# Patient Record
Sex: MALE | Race: OTHER | HISPANIC OR LATINO | Employment: OTHER | URBAN - METROPOLITAN AREA
[De-identification: names, ages, dates, MRNs, and addresses within clinical notes are randomized per-mention and may not be internally consistent; named-entity substitution may affect disease eponyms.]

---

## 2023-03-14 DIAGNOSIS — K21.9 GASTROESOPHAGEAL REFLUX DISEASE, UNSPECIFIED WHETHER ESOPHAGITIS PRESENT: Primary | ICD-10-CM

## 2023-03-14 RX ORDER — LOSARTAN POTASSIUM 25 MG/1
TABLET ORAL EVERY OTHER DAY
COMMUNITY
Start: 2017-06-30 | End: 2023-06-09 | Stop reason: SDUPTHER

## 2023-03-14 RX ORDER — METOPROLOL SUCCINATE 25 MG/1
1 TABLET, EXTENDED RELEASE ORAL EVERY OTHER DAY
COMMUNITY

## 2023-03-14 RX ORDER — ALBUTEROL SULFATE 90 UG/1
AEROSOL, METERED RESPIRATORY (INHALATION) EVERY 6 HOURS PRN
COMMUNITY
Start: 2019-12-05

## 2023-03-14 RX ORDER — PANTOPRAZOLE SODIUM 40 MG/1
TABLET, DELAYED RELEASE ORAL
COMMUNITY
End: 2023-03-14 | Stop reason: SDUPTHER

## 2023-03-14 RX ORDER — PANTOPRAZOLE SODIUM 40 MG/1
40 TABLET, DELAYED RELEASE ORAL 2 TIMES DAILY
Qty: 60 TABLET | Refills: 0 | Status: SHIPPED | OUTPATIENT
Start: 2023-03-14 | End: 2023-04-20 | Stop reason: SDUPTHER

## 2023-03-14 RX ORDER — PREDNISONE 2.5 MG/1
1 TABLET ORAL DAILY
COMMUNITY

## 2023-03-14 RX ORDER — ROSUVASTATIN CALCIUM 10 MG/1
1 TABLET, COATED ORAL NIGHTLY
COMMUNITY
Start: 2020-08-17

## 2023-03-14 RX ORDER — UMECLIDINIUM BROMIDE AND VILANTEROL TRIFENATATE 62.5; 25 UG/1; UG/1
POWDER RESPIRATORY (INHALATION)
COMMUNITY
Start: 2020-11-27

## 2023-03-14 RX ORDER — PREDNISONE 5 MG/1
TABLET ORAL
COMMUNITY
Start: 2018-05-23

## 2023-03-14 RX ORDER — FOLIC ACID 1 MG/1
1 TABLET ORAL DAILY
COMMUNITY
Start: 2016-01-22

## 2023-03-14 RX ORDER — TRAZODONE HYDROCHLORIDE 50 MG/1
TABLET ORAL
COMMUNITY
Start: 2016-05-20 | End: 2023-04-20 | Stop reason: SDUPTHER

## 2023-03-14 RX ORDER — DICYCLOMINE HYDROCHLORIDE 20 MG/1
TABLET ORAL 2 TIMES DAILY
COMMUNITY

## 2023-03-14 RX ORDER — SUCRALFATE 1 G/10ML
SUSPENSION ORAL 4 TIMES DAILY
COMMUNITY
Start: 2022-07-19

## 2023-03-14 NOTE — TELEPHONE ENCOUNTER
Refill Pantoprazole 40mg 1 twice a day prn     Pharmacy: [facility] [phone]   Pharm entered in pt chart    LR: 09/08/22  LV: 01/27/23  NV: 04/28/23

## 2023-04-10 ENCOUNTER — APPOINTMENT (OUTPATIENT)
Dept: PRIMARY CARE | Facility: CLINIC | Age: 78
End: 2023-04-10
Payer: MEDICARE

## 2023-04-11 ENCOUNTER — OFFICE VISIT (OUTPATIENT)
Dept: PRIMARY CARE | Facility: CLINIC | Age: 78
End: 2023-04-11
Payer: MEDICARE

## 2023-04-11 VITALS
WEIGHT: 195 LBS | BODY MASS INDEX: 29.22 KG/M2 | DIASTOLIC BLOOD PRESSURE: 70 MMHG | SYSTOLIC BLOOD PRESSURE: 114 MMHG | TEMPERATURE: 96 F

## 2023-04-11 DIAGNOSIS — L02.31 CELLULITIS AND ABSCESS OF BUTTOCK: ICD-10-CM

## 2023-04-11 DIAGNOSIS — K40.90 INGUINAL HERNIA OF LEFT SIDE WITHOUT OBSTRUCTION OR GANGRENE: Primary | ICD-10-CM

## 2023-04-11 DIAGNOSIS — R19.09 MASS OF LEFT INGUINAL REGION: ICD-10-CM

## 2023-04-11 DIAGNOSIS — L03.317 CELLULITIS AND ABSCESS OF BUTTOCK: ICD-10-CM

## 2023-04-11 PROCEDURE — 99214 OFFICE O/P EST MOD 30 MIN: CPT | Performed by: FAMILY MEDICINE

## 2023-04-11 PROCEDURE — 1160F RVW MEDS BY RX/DR IN RCRD: CPT | Performed by: FAMILY MEDICINE

## 2023-04-11 PROCEDURE — 1159F MED LIST DOCD IN RCRD: CPT | Performed by: FAMILY MEDICINE

## 2023-04-11 RX ORDER — SULFAMETHOXAZOLE AND TRIMETHOPRIM 800; 160 MG/1; MG/1
1 TABLET ORAL 2 TIMES DAILY
Qty: 20 TABLET | Refills: 0 | Status: SHIPPED | OUTPATIENT
Start: 2023-04-11 | End: 2023-04-18

## 2023-04-11 NOTE — PATIENT INSTRUCTIONS
I will prescribe antibiotics for the skin infection on your buttock region.  You have a swelling in the left inguinal region that may be a hernia, although it does not seem to protrude much with coughing.  I will order an ultrasound on this and refer you to a surgeon.  Go to the ER if you get fevers or severe abdominal pain.  Return here in one week for a recheck.  Return sooner if this worsens.

## 2023-04-11 NOTE — PROGRESS NOTES
Subjective   Patient ID: [id number]     Dillon Zaman is a 77 y.o. male who presents for forgetfulness, Rash (On buttocks), Groin Swelling (Left side), and Abdominal Pain (Near left kidney).  It has been there for two or three months.  It itches.  It hurts a little bit.  No drainage.  It is dry.  He has a swelling in the left groin that is tender.  It has been there for three weeks.        HPI  He complains of a rash on the buttocks and swelling of the groin on the left and abdominal pain near the left kidney.      He denies nausea, vomiting, constipation, diarrhea or fever.  Objective     /70 (BP Location: Right arm, Patient Position: Sitting)   Temp 35.6 °C (96 °F)   Wt 88.5 kg (195 lb)   BMI 29.22 kg/m²      Physical Exam  Constitutional:       General: He is not in acute distress.     Appearance: Normal appearance. He is not ill-appearing, toxic-appearing or diaphoretic.   Cardiovascular:      Rate and Rhythm: Normal rate and regular rhythm.      Heart sounds: Normal heart sounds.   Pulmonary:      Effort: Pulmonary effort is normal.      Breath sounds: Normal breath sounds.   Abdominal:      General: Abdomen is flat.      Palpations: Abdomen is soft.      Tenderness: There is no abdominal tenderness.      Hernia: A hernia (soft swelling in left inguinal region that does not seem to protrude with cough.) is present.   Skin:     Comments: Superior aspect of the intergluteal fold on the left side, 1 cm ulcer with tenderness and white purulent drainage.     Neurological:      General: No focal deficit present.      Mental Status: He is alert and oriented to person, place, and time.         Assessment/Plan   Problem List Items Addressed This Visit    None  Visit Diagnoses       Inguinal hernia of left side without obstruction or gangrene    -  Primary    Cellulitis and abscess of buttock        Mass of left inguinal region              I will prescribe antibiotics for the skin infection on your buttock  region.  You have a swelling in the left inguinal region that may be a hernia, although it does not seem to protrude much with coughing.  I will order an ultrasound on this and refer you to a surgeon.  Go to the ER if you get fevers or severe abdominal pain.  Return here in one week for a recheck.  Return sooner if this worsens.  Saturnino Sotomayor, DO

## 2023-04-18 ENCOUNTER — OFFICE VISIT (OUTPATIENT)
Dept: PRIMARY CARE | Facility: CLINIC | Age: 78
End: 2023-04-18
Payer: MEDICARE

## 2023-04-18 VITALS
DIASTOLIC BLOOD PRESSURE: 70 MMHG | TEMPERATURE: 96.8 F | WEIGHT: 195 LBS | SYSTOLIC BLOOD PRESSURE: 130 MMHG | BODY MASS INDEX: 29.22 KG/M2

## 2023-04-18 DIAGNOSIS — K60.3 ANAL FISTULA: Primary | ICD-10-CM

## 2023-04-18 DIAGNOSIS — K40.90 INGUINAL HERNIA OF LEFT SIDE WITHOUT OBSTRUCTION OR GANGRENE: ICD-10-CM

## 2023-04-18 PROCEDURE — 99214 OFFICE O/P EST MOD 30 MIN: CPT | Performed by: FAMILY MEDICINE

## 2023-04-18 PROCEDURE — 1160F RVW MEDS BY RX/DR IN RCRD: CPT | Performed by: FAMILY MEDICINE

## 2023-04-18 PROCEDURE — 1159F MED LIST DOCD IN RCRD: CPT | Performed by: FAMILY MEDICINE

## 2023-04-18 RX ORDER — CIPROFLOXACIN 500 MG/1
500 TABLET ORAL 2 TIMES DAILY
Qty: 20 TABLET | Refills: 0 | Status: SHIPPED | OUTPATIENT
Start: 2023-04-18 | End: 2023-04-28

## 2023-04-18 NOTE — PATIENT INSTRUCTIONS
You agree to call the surgeon for an appointment today.  The importance of this was discussed today.  I will prescribe a new antibiotic today.  Return in one or two weeks for a recheck.

## 2023-04-18 NOTE — PROGRESS NOTES
"Subjective   Patient ID: [id number]     Dillon Zaman is a 77 y.o. male who presents for Follow-up (One week).  HPI  He is here for a one week recheck on cellulitis of the buttock and an inguinal hernia.  He thinks that the hernia is about the same. He has not called for the surgery appointment.      He thinks that the cellulitis is worse.      He had a reaction to a medication given by Dr Alfaro.  He states he has remained on the antibiotic.  He is still on the Bactrim twice daily.      No fever.  No dizziness.  No chest pain.  No abdominal pain.  No nausea or vomiting.  No diarrhea or constipation.  \"Maybe a little diarrhea\".  No blood in the stool.    Objective     /70 (BP Location: Left arm, Patient Position: Sitting)   Temp 36 °C (96.8 °F)   Wt 88.5 kg (195 lb)   BMI 29.22 kg/m²      Physical Exam  Constitutional:       General: He is not in acute distress.     Appearance: Normal appearance. He is not ill-appearing, toxic-appearing or diaphoretic.   Cardiovascular:      Rate and Rhythm: Regular rhythm.   Pulmonary:      Effort: Pulmonary effort is normal. No respiratory distress.      Breath sounds: Normal breath sounds.   Abdominal:      General: Abdomen is flat.      Palpations: Abdomen is soft.      Tenderness: There is no abdominal tenderness.      Hernia: A hernia (left inguinal hernia reducible.  seems smaller today.  Mildly tender.) is present.   Skin:     Comments: Wounds along buttock appear non-infected and improved from last time.    He points to tenderness near the anus.  Inspection reveals an anal fistula with a small amount of purulence draining from it.           Assessment/Plan   Problem List Items Addressed This Visit    None  Visit Diagnoses       Anal fistula    -  Primary    Relevant Medications    ciprofloxacin (Cipro) 500 mg tablet    Other Relevant Orders    Referral to General Surgery    Inguinal hernia of left side without obstruction or gangrene        Relevant Orders    " Referral to General Surgery          You agree to call the surgeon for an appointment today.  The importance of this was discussed today.  I will prescribe a new antibiotic today.  Return in one or two weeks for a recheck.    Saturnino Sotomayor, DO

## 2023-04-19 DIAGNOSIS — F51.01 PRIMARY INSOMNIA: Primary | ICD-10-CM

## 2023-04-19 DIAGNOSIS — K21.9 GASTROESOPHAGEAL REFLUX DISEASE, UNSPECIFIED WHETHER ESOPHAGITIS PRESENT: ICD-10-CM

## 2023-04-19 NOTE — TELEPHONE ENCOUNTER
REFILL  MEDICATION:     Pantoprazole Sodium 40 MG; 1 po daily on empty stomach - may increase to 1 po BID PRN.    LR: 9-8-22  180 tablets with 0 refills     Trazodone HCL 50 MG; Take 2 tablets bedtime.    LR: 3-2-23  60 tablets with 0 refills     PHARM: [facility]   PHARM NUMBER: [phone]    LV: 4-18-23  NV: 4-28-23

## 2023-04-20 RX ORDER — TRAZODONE HYDROCHLORIDE 50 MG/1
50 TABLET ORAL NIGHTLY
Qty: 30 TABLET | Refills: 0 | Status: SHIPPED | OUTPATIENT
Start: 2023-04-20 | End: 2023-06-12 | Stop reason: SDUPTHER

## 2023-04-20 RX ORDER — PANTOPRAZOLE SODIUM 40 MG/1
40 TABLET, DELAYED RELEASE ORAL 2 TIMES DAILY
Qty: 60 TABLET | Refills: 0 | Status: SHIPPED | OUTPATIENT
Start: 2023-04-20 | End: 2023-05-25 | Stop reason: SDUPTHER

## 2023-04-24 ENCOUNTER — DOCUMENTATION (OUTPATIENT)
Dept: PRIMARY CARE | Facility: CLINIC | Age: 78
End: 2023-04-24
Payer: MEDICARE

## 2023-04-24 NOTE — PROGRESS NOTES
Dr Velez called today.  States he has a hernia but his pulmonary status is too bad for surgery and is not recommending surgery.      He states he has an excoriation near the anus but it is not an anal fistula.

## 2023-05-02 ENCOUNTER — APPOINTMENT (OUTPATIENT)
Dept: PRIMARY CARE | Facility: CLINIC | Age: 78
End: 2023-05-02
Payer: MEDICARE

## 2023-05-25 ENCOUNTER — TELEPHONE (OUTPATIENT)
Dept: PRIMARY CARE | Facility: CLINIC | Age: 78
End: 2023-05-25
Payer: MEDICARE

## 2023-05-25 DIAGNOSIS — K21.9 GASTROESOPHAGEAL REFLUX DISEASE, UNSPECIFIED WHETHER ESOPHAGITIS PRESENT: ICD-10-CM

## 2023-05-25 RX ORDER — PANTOPRAZOLE SODIUM 40 MG/1
40 TABLET, DELAYED RELEASE ORAL 2 TIMES DAILY
Qty: 60 TABLET | Refills: 0 | Status: SHIPPED | OUTPATIENT
Start: 2023-05-25

## 2023-05-25 NOTE — TELEPHONE ENCOUNTER
Refill(s) requested for: Pantoprazole (40 mg)    Pharmacy: [facility]  Pharmacy Address: [id number] [facility]    LR: 04/20/2023  LV: 04/18/2023  NV: None and cancelled/no showed last 2 apts scheduled

## 2023-06-09 DIAGNOSIS — I10 PRIMARY HYPERTENSION: Primary | ICD-10-CM

## 2023-06-09 RX ORDER — LOSARTAN POTASSIUM 25 MG/1
25 TABLET ORAL EVERY OTHER DAY
Qty: 45 TABLET | Refills: 0 | Status: SHIPPED | OUTPATIENT
Start: 2023-06-09

## 2023-06-09 NOTE — TELEPHONE ENCOUNTER
Patient requesting medication refill:    1)  Losartan 25mg every other day    He uses [facility] on [address].

## 2023-06-12 DIAGNOSIS — F51.01 PRIMARY INSOMNIA: ICD-10-CM

## 2023-06-12 RX ORDER — TRAZODONE HYDROCHLORIDE 50 MG/1
50 TABLET ORAL NIGHTLY
Qty: 30 TABLET | Refills: 0 | Status: SHIPPED | OUTPATIENT
Start: 2023-06-12

## 2023-06-12 NOTE — TELEPHONE ENCOUNTER
Refill Trazodone 50mg 1 at bedtime     Pharmacy: [facility] [phone]    LR: 04/20/23  LV: 04/18/23  NV: no appt

## 2023-07-11 ENCOUNTER — TELEPHONE (OUTPATIENT)
Dept: PRIMARY CARE | Facility: CLINIC | Age: 78
End: 2023-07-11
Payer: MEDICARE

## 2023-07-11 NOTE — TELEPHONE ENCOUNTER
Spoke with Adam, patient's son.  He has been in the hospital for three weeks for pulmonary embolism and pneumonia.  He requires continued intermittent leave through [facility].  He will email me forms.      He requires off work 2 days a week for the next three months.      Pt will be discharged to a SNF or acute rehab today.

## 2023-07-27 ENCOUNTER — PATIENT OUTREACH (OUTPATIENT)
Dept: CARDIOLOGY | Facility: CLINIC | Age: 78
End: 2023-07-27
Payer: MEDICARE

## 2023-07-27 NOTE — PROGRESS NOTES
Discharge Facility: [facility]  Discharge Diagnosis:Acute on Chronic resp failure with hypoxia  Admission Date: 7/13/23  Discharge Date: 7/26/23    PCP Appointment Date: 8/7/23  Specialist Appointment Date:   Hospital Encounter and Summary: Linked   See discharge assessment below for further details   Pt did refuse LTACH    Engagement  Call Start Time: 1000 (7/27/2023 10:23 AM)    Medications  Medications reviewed with patient/caregiver?: No (pt refused to review and sts kelly takes care of that and she was not available) (7/27/2023 10:23 AM)  Is the patient having any side effects they believe may be caused by any medication additions or changes?: No (7/27/2023 10:23 AM)  Does the patient have all medications ordered at discharge?: Not applicable (Pt sts his daughter did  all medications) (7/27/2023 10:23 AM)  Care Management Interventions: Provided patient education (7/27/2023 10:23 AM)  Is the patient taking all medications as directed (includes completed medication regime)?: -- (Sts kelly takes care of that and not available) (7/27/2023 10:23 AM)    Appointments  Does the patient have a primary care provider?: Yes (7/27/2023 10:23 AM)  Care Management Interventions: Verified appointment date/time/provider (7/27/2023 10:23 AM)    Patient Teaching  Does the patient have access to their discharge instructions?: -- (Sts his son is here helping him and he is on the phone) (7/27/2023 10:23 AM)  Care Management Interventions: Reviewed instructions with patient (7/27/2023 10:23 AM)  What is the patient's perception of their health status since discharge?: Improving (7/27/2023 10:23 AM)  Is the patient/caregiver able to teach back the hierarchy of who to call/visit for symptoms/problems? PCP, Specialist, Home Health nurse, Urgent Care, ED, 911: Yes (7/27/2023 10:23 AM)    Wrap Up  Call End Time: 1026 (7/27/2023 10:23 AM)

## 2023-08-07 ENCOUNTER — APPOINTMENT (OUTPATIENT)
Dept: PRIMARY CARE | Facility: CLINIC | Age: 78
End: 2023-08-07
Payer: MEDICARE